# Patient Record
Sex: FEMALE | Race: WHITE | NOT HISPANIC OR LATINO | Employment: FULL TIME | URBAN - METROPOLITAN AREA
[De-identification: names, ages, dates, MRNs, and addresses within clinical notes are randomized per-mention and may not be internally consistent; named-entity substitution may affect disease eponyms.]

---

## 2023-05-26 ENCOUNTER — OFFICE VISIT (OUTPATIENT)
Dept: FAMILY MEDICINE CLINIC | Facility: CLINIC | Age: 43
End: 2023-05-26

## 2023-05-26 VITALS
WEIGHT: 174.4 LBS | SYSTOLIC BLOOD PRESSURE: 122 MMHG | OXYGEN SATURATION: 99 % | TEMPERATURE: 97.8 F | BODY MASS INDEX: 30.9 KG/M2 | DIASTOLIC BLOOD PRESSURE: 78 MMHG | HEART RATE: 95 BPM | HEIGHT: 63 IN

## 2023-05-26 DIAGNOSIS — Z12.31 SCREENING MAMMOGRAM FOR BREAST CANCER: ICD-10-CM

## 2023-05-26 DIAGNOSIS — M25.50 ARTHRALGIA, UNSPECIFIED JOINT: ICD-10-CM

## 2023-05-26 DIAGNOSIS — Z11.59 ENCOUNTER FOR HEPATITIS C SCREENING TEST FOR LOW RISK PATIENT: ICD-10-CM

## 2023-05-26 DIAGNOSIS — Z11.4 SCREENING FOR HIV (HUMAN IMMUNODEFICIENCY VIRUS): ICD-10-CM

## 2023-05-26 DIAGNOSIS — Z13.220 SCREENING FOR LIPID DISORDERS: ICD-10-CM

## 2023-05-26 DIAGNOSIS — Z13.1 SCREENING FOR DIABETES MELLITUS: ICD-10-CM

## 2023-05-26 DIAGNOSIS — Z00.00 ANNUAL PHYSICAL EXAM: Primary | ICD-10-CM

## 2023-05-26 DIAGNOSIS — Z12.4 SCREENING FOR CERVICAL CANCER: ICD-10-CM

## 2023-05-26 DIAGNOSIS — E66.9 OBESITY (BMI 30.0-34.9): ICD-10-CM

## 2023-05-26 DIAGNOSIS — R53.82 CHRONIC FATIGUE: ICD-10-CM

## 2023-05-26 DIAGNOSIS — G47.00 INSOMNIA, UNSPECIFIED TYPE: ICD-10-CM

## 2023-05-26 PROBLEM — E66.811 OBESITY (BMI 30.0-34.9): Status: ACTIVE | Noted: 2023-05-26

## 2023-05-26 RX ORDER — PHENTERMINE HYDROCHLORIDE 37.5 MG/1
37.5 CAPSULE ORAL EVERY MORNING
COMMUNITY

## 2023-05-26 NOTE — PROGRESS NOTES
[facility]    NAME: Brandee Reis  AGE: 39 y o  SEX: female  : 1980     DATE: 2023     Assessment and Plan:     Problem List Items Addressed This Visit        Other    Screening mammogram for breast cancer    Relevant Orders    Mammo screening bilateral w 3d & cad    Screening for cervical cancer    Relevant Orders    Ambulatory Referral to Gynecology    Annual physical exam - Primary    Obesity (BMI 30 0-34  9)    Insomnia    Chronic fatigue    Relevant Orders    Comprehensive metabolic panel    TSH, 3rd generation with Free T4 reflex    Arthralgia    Relevant Orders    Comprehensive metabolic panel    TSH, 3rd generation with Free T4 reflex    CBC and differential    Screening for diabetes mellitus    Relevant Orders    HEMOGLOBIN A1C W/ EAG ESTIMATION    Screening for lipid disorders    Relevant Orders    Lipid Panel with Direct LDL reflex    Encounter for hepatitis C screening test for low risk patient    Relevant Orders    Hepatitis C antibody    Screening for HIV (human immunodeficiency virus)    Relevant Orders    : HIV 1/2 AB/AG w Reflex [facility] for 2 yr old and above       Immunizations and preventive care screenings were discussed with patient today  Appropriate education was printed on patient's after visit summary  Counseling:  Injury prevention: discussed safety/seat belts, safety helmets, smoke detectors, carbon dioxide detectors, and smoking near bedding or upholstery  Exercise: the importance of regular exercise/physical activity was discussed  Recommend exercise 3-5 times per week for at least 30 minutes  BMI Counseling: Body mass index is 30 89 kg/m²   The BMI is above normal  Nutrition recommendations include decreasing portion sizes, encouraging healthy choices of fruits and vegetables, decreasing fast food intake, consuming healthier snacks, limiting drinks that contain sugar, moderation in carbohydrate intake, increasing intake of lean protein, reducing intake of saturated and trans fat and reducing intake of cholesterol  Exercise recommendations include moderate physical activity 150 minutes/week  No pharmacotherapy was ordered  Patient referred to PCP  Rationale for BMI follow-up plan is due to patient being overweight or obese  Depression Screening and Follow-up Plan: Patient was screened for depression during today's encounter  They screened negative with a PHQ-2 score of 0  Return in 1 year (on 5/26/2024)  Chief Complaint:     Chief Complaint   Patient presents with   • Establish Care      History of Present Illness:     Adult Annual Physical   Patient here for a comprehensive physical exam  The patient reports problems - herer to establish care   Patient here today to establish care and reports that she is due for her annual and has not been to PCP in a long time and would like ot have her labs checked for hypothyroid and is tryng to lose weight for the past year and is not losing weight Patient has no issues with past medical history Patient is recently on Phentermine Dr Randall Barthel and has been on for weight loss recently  Diet and Physical Activity  Diet/Nutrition: well balanced diet  Exercise: walking  Depression Screening  PHQ-2/9 Depression Screening    Little interest or pleasure in doing things: 0 - not at all  Feeling down, depressed, or hopeless: 0 - not at all  PHQ-2 Score: 0  PHQ-2 Interpretation: Negative depression screen       General Health  Sleep: sleeps poorly and unrefreshing sleep  Hearing: normal - bilateral   Vision: no vision problems, most recent eye exam <1 year ago and wears contacts  Dental: regular dental visits  /GYN Health  Patient is: perimenopausal  Last menstrual period: 4/25/2023  Contraceptive method: none  Review of Systems:     Review of Systems   Constitutional: Positive for fatigue   Negative for activity change, appetite change, chills, diaphoresis, fever and unexpected weight change  Patient has eliminated dairy and carbs and eating salads and eating healthy diet Patient also exercises and walking and does about 30 minutes daily    HENT: Negative for congestion, dental problem, drooling, ear discharge, ear pain, facial swelling, hearing loss, postnasal drip, sinus pressure, sinus pain, sneezing, sore throat, tinnitus, trouble swallowing and voice change  Eyes: Negative for pain, discharge, redness, itching and visual disturbance  Wearing contacts and last eye exam was in 12/2022   Respiratory: Negative for apnea, cough, choking, chest tightness, shortness of breath, wheezing and stridor  Cardiovascular: Negative for chest pain, palpitations and leg swelling  Gastrointestinal: Positive for constipation  Negative for abdominal distention, abdominal pain, anal bleeding, blood in stool, diarrhea, nausea, rectal pain and vomiting  Endocrine: Positive for polydipsia  Genitourinary: Negative  Musculoskeletal: Positive for arthralgias and myalgias  Negative for back pain, gait problem, joint swelling, neck pain and neck stiffness  Skin: Negative  Allergic/Immunologic: Negative  Negative for environmental allergies and food allergies  Neurological: Negative for dizziness, tremors, seizures, syncope, facial asymmetry, speech difficulty, weakness, light-headedness, numbness and headaches  Hematological: Negative  Negative for adenopathy  Does not bruise/bleed easily  Psychiatric/Behavioral: Negative for agitation, behavioral problems, confusion, decreased concentration, dysphoric mood, hallucinations, self-injury, sleep disturbance and suicidal ideas  The patient is not nervous/anxious and is not hyperactive  Has had troubles with insomnia in the past       Past Medical History:     No past medical history on file  Past Surgical History:     No past surgical history on file  "Social History:     Social History     Socioeconomic History   • Marital status:      Spouse name: None   • Number of children: None   • Years of education: None   • Highest education level: None   Occupational History   • None   Tobacco Use   • Smoking status: Never   • Smokeless tobacco: Never   Substance and Sexual Activity   • Alcohol use: None   • Drug use: None   • Sexual activity: None   Other Topics Concern   • None   Social History Narrative   • None     Social Determinants of Health     Financial Resource Strain: Not on file   Food Insecurity: Not on file   Transportation Needs: Not on file   Physical Activity: Not on file   Stress: Not on file   Social Connections: Not on file   Intimate Partner Violence: Not on file   Housing Stability: Not on file      Family History:     No family history on file  Current Medications:     Current Outpatient Medications   Medication Sig Dispense Refill   • phentermine 37 5 MG capsule Take 37 5 mg by mouth every morning       No current facility-administered medications for this visit  Allergies:     No Known Allergies   Physical Exam:     /78 (BP Location: Left arm, Patient Position: Sitting)   Pulse 95   Temp 97 8 °F (36 6 °C) (Temporal)   Ht 5' 3\" (1 6 m)   Wt 79 1 kg (174 lb 6 4 oz)   SpO2 99%   BMI 30 89 kg/m²     Physical Exam  Vitals and nursing note reviewed  Constitutional:       General: She is not in acute distress  Appearance: She is well-developed  HENT:      Head: Normocephalic and atraumatic  Eyes:      Conjunctiva/sclera: Conjunctivae normal    Cardiovascular:      Rate and Rhythm: Normal rate and regular rhythm  Heart sounds: No murmur heard  Pulmonary:      Effort: Pulmonary effort is normal  No respiratory distress  Breath sounds: Normal breath sounds  Abdominal:      Palpations: Abdomen is soft  Tenderness: There is no abdominal tenderness  Musculoskeletal:         General: No swelling        " Cervical back: Neck supple  Skin:     General: Skin is warm and dry  Capillary Refill: Capillary refill takes less than 2 seconds  Neurological:      Mental Status: She is alert     Psychiatric:         Mood and Affect: Mood normal           Irma Abdalla, [address]

## 2023-07-25 PROBLEM — Z12.4 SCREENING FOR CERVICAL CANCER: Status: RESOLVED | Noted: 2023-05-26 | Resolved: 2023-07-25

## 2023-07-25 PROBLEM — Z13.220 SCREENING FOR LIPID DISORDERS: Status: RESOLVED | Noted: 2023-05-26 | Resolved: 2023-07-25

## 2023-07-25 PROBLEM — Z13.1 SCREENING FOR DIABETES MELLITUS: Status: RESOLVED | Noted: 2023-05-26 | Resolved: 2023-07-25

## 2024-04-24 ENCOUNTER — APPOINTMENT (OUTPATIENT)
Dept: LAB | Facility: MEDICAL CENTER | Age: 44
End: 2024-04-24
Payer: COMMERCIAL

## 2024-04-24 DIAGNOSIS — M25.50 ARTHRALGIA, UNSPECIFIED JOINT: ICD-10-CM

## 2024-04-24 DIAGNOSIS — Z11.4 SCREENING FOR HIV (HUMAN IMMUNODEFICIENCY VIRUS): ICD-10-CM

## 2024-04-24 DIAGNOSIS — R53.82 CHRONIC FATIGUE: ICD-10-CM

## 2024-04-24 DIAGNOSIS — Z13.1 SCREENING FOR DIABETES MELLITUS: ICD-10-CM

## 2024-04-24 DIAGNOSIS — Z13.220 SCREENING FOR LIPID DISORDERS: ICD-10-CM

## 2024-04-24 DIAGNOSIS — Z11.59 ENCOUNTER FOR HEPATITIS C SCREENING TEST FOR LOW RISK PATIENT: ICD-10-CM

## 2024-04-24 LAB
ALBUMIN SERPL BCP-MCNC: 3.9 G/DL (ref 3.5–5)
ALP SERPL-CCNC: 47 U/L (ref 34–104)
ALT SERPL W P-5'-P-CCNC: 10 U/L (ref 7–52)
ANION GAP SERPL CALCULATED.3IONS-SCNC: 5 MMOL/L (ref 4–13)
AST SERPL W P-5'-P-CCNC: 13 U/L (ref 13–39)
BASOPHILS # BLD AUTO: 0.05 THOUSANDS/ÂΜL (ref 0–0.1)
BASOPHILS NFR BLD AUTO: 1 % (ref 0–1)
BILIRUB SERPL-MCNC: 0.53 MG/DL (ref 0.2–1)
BUN SERPL-MCNC: 12 MG/DL (ref 5–25)
CALCIUM SERPL-MCNC: 9.1 MG/DL (ref 8.4–10.2)
CHLORIDE SERPL-SCNC: 106 MMOL/L (ref 96–108)
CHOLEST SERPL-MCNC: 180 MG/DL
CO2 SERPL-SCNC: 28 MMOL/L (ref 21–32)
CREAT SERPL-MCNC: 0.75 MG/DL (ref 0.6–1.3)
EOSINOPHIL # BLD AUTO: 0.11 THOUSAND/ÂΜL (ref 0–0.61)
EOSINOPHIL NFR BLD AUTO: 2 % (ref 0–6)
ERYTHROCYTE [DISTWIDTH] IN BLOOD BY AUTOMATED COUNT: 12.6 % (ref 11.6–15.1)
EST. AVERAGE GLUCOSE BLD GHB EST-MCNC: 97 MG/DL
GFR SERPL CREATININE-BSD FRML MDRD: 97 ML/MIN/1.73SQ M
GLUCOSE P FAST SERPL-MCNC: 75 MG/DL (ref 65–99)
HBA1C MFR BLD: 5 %
HCT VFR BLD AUTO: 42.5 % (ref 34.8–46.1)
HCV AB SER QL: NORMAL
HDLC SERPL-MCNC: 62 MG/DL
HGB BLD-MCNC: 13.8 G/DL (ref 11.5–15.4)
HIV 1+2 AB+HIV1 P24 AG SERPL QL IA: NORMAL
HIV 2 AB SERPL QL IA: NORMAL
HIV1 AB SERPL QL IA: NORMAL
HIV1 P24 AG SERPL QL IA: NORMAL
IMM GRANULOCYTES # BLD AUTO: 0.02 THOUSAND/UL (ref 0–0.2)
IMM GRANULOCYTES NFR BLD AUTO: 0 % (ref 0–2)
LDLC SERPL CALC-MCNC: 105 MG/DL (ref 0–100)
LYMPHOCYTES # BLD AUTO: 1.53 THOUSANDS/ÂΜL (ref 0.6–4.47)
LYMPHOCYTES NFR BLD AUTO: 29 % (ref 14–44)
MCH RBC QN AUTO: 30.5 PG (ref 26.8–34.3)
MCHC RBC AUTO-ENTMCNC: 32.5 G/DL (ref 31.4–37.4)
MCV RBC AUTO: 94 FL (ref 82–98)
MONOCYTES # BLD AUTO: 0.47 THOUSAND/ÂΜL (ref 0.17–1.22)
MONOCYTES NFR BLD AUTO: 9 % (ref 4–12)
NEUTROPHILS # BLD AUTO: 3.18 THOUSANDS/ÂΜL (ref 1.85–7.62)
NEUTS SEG NFR BLD AUTO: 59 % (ref 43–75)
NRBC BLD AUTO-RTO: 0 /100 WBCS
PLATELET # BLD AUTO: 200 THOUSANDS/UL (ref 149–390)
PMV BLD AUTO: 11.6 FL (ref 8.9–12.7)
POTASSIUM SERPL-SCNC: 4.3 MMOL/L (ref 3.5–5.3)
PROT SERPL-MCNC: 6.9 G/DL (ref 6.4–8.4)
RBC # BLD AUTO: 4.53 MILLION/UL (ref 3.81–5.12)
SODIUM SERPL-SCNC: 139 MMOL/L (ref 135–147)
T4 FREE SERPL-MCNC: 0.65 NG/DL (ref 0.61–1.12)
TRIGL SERPL-MCNC: 66 MG/DL
TSH SERPL DL<=0.05 MIU/L-ACNC: 4.79 UIU/ML (ref 0.45–4.5)
WBC # BLD AUTO: 5.36 THOUSAND/UL (ref 4.31–10.16)

## 2024-04-24 PROCEDURE — 84439 ASSAY OF FREE THYROXINE: CPT

## 2024-04-24 PROCEDURE — 84443 ASSAY THYROID STIM HORMONE: CPT

## 2024-04-24 PROCEDURE — 80053 COMPREHEN METABOLIC PANEL: CPT

## 2024-04-24 PROCEDURE — 87389 HIV-1 AG W/HIV-1&-2 AB AG IA: CPT

## 2024-04-24 PROCEDURE — 80061 LIPID PANEL: CPT

## 2024-04-24 PROCEDURE — 83036 HEMOGLOBIN GLYCOSYLATED A1C: CPT

## 2024-04-24 PROCEDURE — 36415 COLL VENOUS BLD VENIPUNCTURE: CPT

## 2024-04-24 PROCEDURE — 86803 HEPATITIS C AB TEST: CPT

## 2024-04-24 PROCEDURE — 85025 COMPLETE CBC W/AUTO DIFF WBC: CPT

## 2024-04-25 ENCOUNTER — TELEPHONE (OUTPATIENT)
Age: 44
End: 2024-04-25

## 2024-04-25 DIAGNOSIS — R79.89 ABNORMAL TSH: Primary | ICD-10-CM

## 2024-04-25 NOTE — TELEPHONE ENCOUNTER
Patient called in to follow up on lab results. Patient would like someone to review them with her. Warm transfer to CTS attempted but unsuccessful. Please return a patient call.

## 2024-04-25 NOTE — TELEPHONE ENCOUNTER
Spoke with pt to try to set up appt sooner then the May date with Dr Kennedy,. She said she would like Dr Kennedy to review results and advise if there is anything she could do to try to get better until the appt in May. She has been feeling horrible as far as no energy, achey, very fatigued. Something has to be going on or there muist be something she can try. Please review and advise

## 2024-04-26 ENCOUNTER — APPOINTMENT (OUTPATIENT)
Age: 44
End: 2024-04-26
Payer: COMMERCIAL

## 2024-04-26 ENCOUNTER — TELEPHONE (OUTPATIENT)
Age: 44
End: 2024-04-26

## 2024-04-26 DIAGNOSIS — R79.89 ABNORMAL TSH: ICD-10-CM

## 2024-04-26 PROCEDURE — 36415 COLL VENOUS BLD VENIPUNCTURE: CPT

## 2024-04-26 PROCEDURE — 86376 MICROSOMAL ANTIBODY EACH: CPT

## 2024-04-26 NOTE — TELEPHONE ENCOUNTER
Patient called back and received information. She was at another doctor's office and was unable to schedule at the time. She states she would call back to schedule an appointment.

## 2024-04-26 NOTE — TELEPHONE ENCOUNTER
Patient called in to return a missed call from [facility]. Warm transferred to [facility].

## 2024-04-27 LAB — THYROPEROXIDASE AB SERPL-ACNC: 14 IU/ML (ref 0–34)

## 2024-04-28 DIAGNOSIS — R79.89 ABNORMAL TSH: Primary | ICD-10-CM

## 2024-05-14 ENCOUNTER — OFFICE VISIT (OUTPATIENT)
Dept: BARIATRICS | Facility: CLINIC | Age: 44
End: 2024-05-14
Payer: COMMERCIAL

## 2024-05-14 VITALS
SYSTOLIC BLOOD PRESSURE: 110 MMHG | DIASTOLIC BLOOD PRESSURE: 80 MMHG | RESPIRATION RATE: 14 BRPM | BODY MASS INDEX: 30.56 KG/M2 | HEIGHT: 64 IN | HEART RATE: 76 BPM | WEIGHT: 179 LBS

## 2024-05-14 DIAGNOSIS — R53.82 CHRONIC FATIGUE: ICD-10-CM

## 2024-05-14 DIAGNOSIS — N92.6 IRREGULAR MENSES: ICD-10-CM

## 2024-05-14 DIAGNOSIS — E66.09 CLASS 1 OBESITY DUE TO EXCESS CALORIES IN ADULT: Primary | ICD-10-CM

## 2024-05-14 DIAGNOSIS — G47.00 INSOMNIA: ICD-10-CM

## 2024-05-14 PROCEDURE — 99204 OFFICE O/P NEW MOD 45 MIN: CPT | Performed by: FAMILY MEDICINE

## 2024-05-14 NOTE — PROGRESS NOTES
Assessment/Plan:  Radha was seen today for consult.    Diagnoses and all orders for this visit:    Class 1 obesity due to excess calories in adult  -     Semaglutide-Weight Management (WEGOVY) 0.25 MG/0.5ML; Inject 0.5 mL (0.25 mg total) under the skin once a week    Chronic fatigue    Insomnia    Irregular menses    If Wegovy not covered could try Phentermine again  TSH high will repeat in 1 mo  - Patient counselled about the potential side effects of this medication that are listed by the drug .Patient denies personal history of pancreatitis. Patient also denies personal and family history of medullary thyroid cancer and multiple endocrine neoplasia type 2 (MEN 2 tumor). Contraception methods: tubal ligation   Patient counselled to view the website for this medicine and read detailed instructions and side effects. Patient was given opportunity to ask questions and all questions were answered.  Patient confirms understanding and agrees to use the medication as prescribed.       Obesity:   Weight not at goal and patient tried more than 6 months to lose weight and was not able to achieve a meaningful weight loss above 5%  - Discussed options of [facility]-Intensive Lifestyle Intervention Program and Conservative Program and the role of weight loss medications.  - Patient is interested in pursuing Conservative Program  - Initial weight loss goal of 5-10% weight loss for improved health  - Weight loss can improve patient's co-morbid conditions and/or prevent weight-related complications.  Motivational interview performed and patient noted changes to work on until next visit  Handouts provided:  Calorie goal handouts provided  1200kcal  Hydration: 64oz fluid, no sugary drinks  Goal 3 meals per day  Food log encouraged , phone michael or paper journal  Increase physical activity by 10 minutes daily.   Return in 1 mo    Subjective:   Chief Complaint   Patient presents with    Consult     Initial visit with  "Medical Bariatrician.       Patient ID: Radha Lan  is a 44 y.o. female with excess weight/obesity here to pursue weight management.  Previous notes and records have been reviewed.        HPI  Wt Readings from Last 20 Encounters:   24 81.2 kg (179 lb)   23 79.1 kg (174 lb 6.4 oz)   Body mass index is 31.21 kg/m².   Was able to lose 20lbs 2 years back   Did a lot of changes in her diet in the past 2 years no red meat   Does not feel hungry   B-eggs   L-salmon pepper sweet potato  D-salad  Snacks:sliced apple   Hydration:water one cup coffee   Alcohol: no  Smoking:no  Exercise:walking 25 min daily at lunch time  Occupation:accounting   Sleep:not well    History reviewed. No pertinent past medical history.  Past Surgical History:   Procedure Laterality Date     SECTION       The following portions of the patient's history were reviewed and updated as appropriate: allergies, current medications, past family history, past medical history, past social history, past surgical history, and problem list.    ROS:  Review of Systems   Constitutional: Negative for activity change. Fatigue  HENT: Negative for trouble swallowing.    Respiratory: Negative for shortness of breath.    Cardiovascular: Negative for chest pain, edema  Gastrointestinal: Negative for abdominal pain, nausea and vomiting, acid reflux, constipation/diarrhea  Endocrine:  abnormal menses   Psychiatric/Behavioral: Negative for behavioral problems including anxiety /depression  Objective:  /80 (BP Location: Left arm, Patient Position: Sitting, Cuff Size: Standard)   Pulse 76   Resp 14   Ht 5' 3.5\" (1.613 m)   Wt 81.2 kg (179 lb)   BMI 31.21 kg/m²   Constitutional: Well-developed, well-nourished and Obese Body mass index is 31.21 kg/m².. Alert, cooperative.  HEENT: No conjunctival injection. No thyroid masses  Pulmonary: No increased work of breathing or signs of respiratory distress.Clear respiratory sounds.  CV: Well-perfused, " Regular rate and rhythm, no murmurs  Vascular: no peripheral edema  GI: Abdomen obese, Non-distended. Not tender  MSK: no sarcopenia noted   Neuro: Oriented to person, place and time. Normal Speech. Normal gait.  Psych: Normal affect and mood. Normal thought process, no delusions     Labs and Imaging  Recent labs and imaging have been personally reviewed.  Lab Results   Component Value Date    WBC 5.36 04/24/2024    HGB 13.8 04/24/2024    HCT 42.5 04/24/2024    MCV 94 04/24/2024     04/24/2024     Lab Results   Component Value Date    SODIUM 139 04/24/2024    K 4.3 04/24/2024     04/24/2024    CO2 28 04/24/2024    AGAP 5 04/24/2024    BUN 12 04/24/2024    CREATININE 0.75 04/24/2024    GLUF 75 04/24/2024    CALCIUM 9.1 04/24/2024    AST 13 04/24/2024    ALT 10 04/24/2024    ALKPHOS 47 04/24/2024    TP 6.9 04/24/2024    TBILI 0.53 04/24/2024    EGFR 97 04/24/2024     Lab Results   Component Value Date    HGBA1C 5.0 04/24/2024     Lab Results   Component Value Date    FYN3CNFDAHAE 4.795 (H) 04/24/2024     Lab Results   Component Value Date    CHOLESTEROL 180 04/24/2024     Lab Results   Component Value Date    HDL 62 04/24/2024     Lab Results   Component Value Date    TRIG 66 04/24/2024     Lab Results   Component Value Date    LDLCALC 105 (H) 04/24/2024

## 2024-05-15 ENCOUNTER — TELEPHONE (OUTPATIENT)
Age: 44
End: 2024-05-15

## 2024-05-15 NOTE — TELEPHONE ENCOUNTER
PA for wegovy    Submitted via    []CMM-KEY    [x][place]-Case ID #    []Faxed to plan   []Other website    []Phone call Case ID #       Office notes sent, clinical questions answered. Awaiting determination    Turnaround time for your insurance to make a decision on your Prior Authorization can take 7-21 business days.

## 2024-05-16 NOTE — TELEPHONE ENCOUNTER
PA for wegovy EXCLUDED from plan       Reason:See attached in media        Message sent to office clinical pool Yes

## 2024-05-19 ENCOUNTER — RA CDI HCC (OUTPATIENT)
Dept: OTHER | Facility: HOSPITAL | Age: 44
End: 2024-05-19

## 2024-05-20 NOTE — PROGRESS NOTES
HCC coding opportunities       Chart reviewed, no opportunity found: CHART REVIEWED, NO OPPORTUNITY FOUND        Patients Insurance        Commercial Insurance: [employer]

## 2024-05-20 NOTE — TELEPHONE ENCOUNTER
Patient called for an update, advised her we are just waiting to hear from  what she would like to send in instead since Wegovy is excluded from plan.

## 2024-05-21 LAB
T4 FREE SERPL-MCNC: 0.9 NG/DL (ref 0.8–1.8)
TSH SERPL-ACNC: 3.87 MIU/L

## 2024-05-24 ENCOUNTER — TELEPHONE (OUTPATIENT)
Dept: BARIATRICS | Facility: CLINIC | Age: 44
End: 2024-05-24

## 2024-05-24 ENCOUNTER — TELEPHONE (OUTPATIENT)
Age: 44
End: 2024-05-24

## 2024-05-24 NOTE — TELEPHONE ENCOUNTER
Good Day,    Please see patient's request for alternate therapy due to medication denial. Please advise staff how best to guide patient.    Thank you.

## 2024-05-24 NOTE — TELEPHONE ENCOUNTER
"Message sent to patient via MY CHART PORTAL:     \" Dr. Rogers will be returning on May 29th to the office. At that time she will review your chart and decide if there is another option for medication. Thank you for your patience \"."

## 2024-05-24 NOTE — TELEPHONE ENCOUNTER
Patient returning call, she called in 5/20 regarding Wegovy not being covered under her plan and is looking for other recommendations for medication for her to try instead. Patient can be reached at [phone]

## 2024-05-24 NOTE — TELEPHONE ENCOUNTER
"MESSAGE SENT TO PATIENT'S MY CHART PORTAL:      \"Dr. Rogers will be returning on May 29th to the office. At that time she will review your chart and decide if there is another option for medication. Thank you for your patience\" .  "

## 2024-05-28 ENCOUNTER — OFFICE VISIT (OUTPATIENT)
Dept: FAMILY MEDICINE CLINIC | Facility: CLINIC | Age: 44
End: 2024-05-28
Payer: COMMERCIAL

## 2024-05-28 VITALS
HEART RATE: 84 BPM | HEIGHT: 64 IN | SYSTOLIC BLOOD PRESSURE: 112 MMHG | DIASTOLIC BLOOD PRESSURE: 80 MMHG | OXYGEN SATURATION: 100 % | WEIGHT: 181 LBS | TEMPERATURE: 97.8 F | BODY MASS INDEX: 30.9 KG/M2

## 2024-05-28 DIAGNOSIS — L65.9 HAIR LOSS: ICD-10-CM

## 2024-05-28 DIAGNOSIS — R53.82 CHRONIC FATIGUE: ICD-10-CM

## 2024-05-28 DIAGNOSIS — M79.10 MYALGIA: ICD-10-CM

## 2024-05-28 DIAGNOSIS — G47.00 INSOMNIA, UNSPECIFIED TYPE: ICD-10-CM

## 2024-05-28 DIAGNOSIS — M25.50 ARTHRALGIA, UNSPECIFIED JOINT: ICD-10-CM

## 2024-05-28 DIAGNOSIS — Z12.11 SCREENING FOR COLON CANCER: ICD-10-CM

## 2024-05-28 DIAGNOSIS — Z00.00 HEALTH MAINTENANCE EXAMINATION: Primary | ICD-10-CM

## 2024-05-28 DIAGNOSIS — Z12.31 SCREENING MAMMOGRAM FOR BREAST CANCER: ICD-10-CM

## 2024-05-28 PROBLEM — Z11.4 SCREENING FOR HIV (HUMAN IMMUNODEFICIENCY VIRUS): Status: RESOLVED | Noted: 2023-05-26 | Resolved: 2024-05-28

## 2024-05-28 PROBLEM — Z11.59 ENCOUNTER FOR HEPATITIS C SCREENING TEST FOR LOW RISK PATIENT: Status: RESOLVED | Noted: 2023-05-26 | Resolved: 2024-05-28

## 2024-05-28 PROCEDURE — 99396 PREV VISIT EST AGE 40-64: CPT | Performed by: FAMILY MEDICINE

## 2024-05-28 NOTE — PROGRESS NOTES
Ambulatory Visit  Name: Radha Lan      : 1980      MRN: [id number]  Encounter Provider: Asa Kennedy MD  Encounter Date: 2024   Encounter department: [facility]    Assessment & Plan   1. Health maintenance examination  Normal exam    2. Screening mammogram for breast cancer  -     Mammo screening bilateral w 3d & cad; Future  3. Hair loss  -     Iron Panel (Includes Ferritin, Iron Sat%, Iron, and TIBC); Future  4. Arthralgia, unspecified joint  -     TARAN Screen w/ Reflex to Titer/Pattern; Future  -     Rheumatoid Arthritis Factor; Future  -     C-reactive protein; Future  5. Screening for colon cancer  -     Ambulatory referral to Gastroenterology; Future  -     Ambulatory referral to Gastroenterology; Future  6. Myalgia  -     CK; Future  7. Chronic fatigue  8. Insomnia, unspecified type    Follow up in 1 year or as needed       History of Present Illness     Patient is here for a yearly exam.  She has been losing her hair also has multiple joint aches and pains.  Feels tired all the time and also has insomnia.  Had abnormal TSH in the past which has now normalized.      Review of Systems   Constitutional:  Positive for fatigue. Negative for activity change, appetite change and fever.   HENT:  Negative for congestion and ear discharge.    Respiratory:  Negative for cough and shortness of breath.    Cardiovascular:  Negative for chest pain and palpitations.   Gastrointestinal:  Negative for diarrhea and nausea.   Musculoskeletal:  Positive for arthralgias and myalgias. Negative for back pain.   Skin:  Negative for color change and rash.   Neurological:  Negative for dizziness and headaches.   Psychiatric/Behavioral:  Positive for sleep disturbance. Negative for agitation and behavioral problems.      History reviewed. No pertinent past medical history.  Past Surgical History:   Procedure Laterality Date   •  SECTION       Family History   Problem Relation Age of Onset  "  • Arthritis Mother    • Esophageal cancer Father    • Ovarian cancer Sister      Social History     Tobacco Use   • Smoking status: Never   • Smokeless tobacco: Never   Substance and Sexual Activity   • Alcohol use: Not Currently   • Drug use: Never   • Sexual activity: Not on file     Current Outpatient Medications on File Prior to Visit   Medication Sig   • [DISCONTINUED] Semaglutide-Weight Management (WEGOVY) 0.25 MG/0.5ML Inject 0.5 mL (0.25 mg total) under the skin once a week (Patient not taking: Reported on 5/28/2024)     No Known Allergies  Immunization History   Administered Date(s) Administered   • COVID-19 PFIZER VACCINE 0.3 ML IM 01/07/2022, 03/07/2022   • COVID-19 Pfizer vac (Ravindra-sucrose, gray cap) 12 yr+ IM 03/07/2022     Objective     /80 (BP Location: Left arm, Patient Position: Sitting)   Pulse 84   Temp 97.8 °F (36.6 °C) (Temporal)   Ht 5' 3.5\" (1.613 m)   Wt 82.1 kg (181 lb)   SpO2 100%   BMI 31.56 kg/m²     Physical Exam  Vitals and nursing note reviewed.   Constitutional:       General: She is not in acute distress.     Appearance: She is well-developed.   HENT:      Head: Normocephalic and atraumatic.   Eyes:      Conjunctiva/sclera: Conjunctivae normal.   Cardiovascular:      Rate and Rhythm: Normal rate and regular rhythm.      Heart sounds: No murmur heard.  Pulmonary:      Effort: Pulmonary effort is normal. No respiratory distress.      Breath sounds: Normal breath sounds.   Abdominal:      Palpations: Abdomen is soft.      Tenderness: There is no abdominal tenderness.   Musculoskeletal:         General: No swelling.      Cervical back: Neck supple.   Skin:     General: Skin is warm and dry.      Capillary Refill: Capillary refill takes less than 2 seconds.   Neurological:      Mental Status: She is alert.   Psychiatric:         Mood and Affect: Mood normal.       Administrative Statements         "

## 2024-06-06 ENCOUNTER — TELEPHONE (OUTPATIENT)
Age: 44
End: 2024-06-06

## 2024-06-06 DIAGNOSIS — E66.9 OBESITY, CLASS I, BMI 30-34.9: Primary | ICD-10-CM

## 2024-06-06 RX ORDER — PHENTERMINE HYDROCHLORIDE 15 MG/1
15 CAPSULE ORAL EVERY MORNING
Qty: 30 CAPSULE | Refills: 1 | Status: SHIPPED | OUTPATIENT
Start: 2024-06-06

## 2024-06-06 NOTE — TELEPHONE ENCOUNTER
Patient was never contacted after the last [employer] message regarding medication alternatives to Wegovy.  Can someone please give her a call today?  Thanks.

## 2024-06-12 ENCOUNTER — TELEPHONE (OUTPATIENT)
Age: 44
End: 2024-06-12

## 2024-06-12 ENCOUNTER — PREP FOR PROCEDURE (OUTPATIENT)
Age: 44
End: 2024-06-12

## 2024-06-12 DIAGNOSIS — Z12.11 SCREENING FOR COLON CANCER: Primary | ICD-10-CM

## 2024-06-12 NOTE — TELEPHONE ENCOUNTER
Scheduled date of colonoscopy (as of today): 6/28/24  Physician performing colonoscopy: Jaspreet  Location of colonoscopy: [facility]  Bowel prep reviewed with patient: teresita/[email or web address]  Instructions to [facility]    06/12/24  Screened by: Shandra Garza    Referring Provider     Pre- Screening:     There is no height or weight on file to calculate BMI. 31.56  Has patient been referred for a routine screening Colonoscopy? yes  Is the patient between 45-75 years old? yes      Previous Colonoscopy no   If yes:    Date:     Facility:     Reason:         Does the patient want to see a Gastroenterologist prior to their procedure OR are they having any GI symptoms? no    Has the patient been hospitalized or had abdominal surgery in the past 6 months? no    Does the patient use supplemental oxygen? no    Does the patient take Coumadin, Lovenox, Plavix, Elliquis, Xarelto, or other blood thinning medication? no    Has the patient had a stroke, cardiac event, or stent placed in the past year? no      If patient is between 45yrs - 49yrs, please advise patient that we will have to confirm benefits & coverage with their insurance company for a routine screening colonoscopy.

## 2024-06-27 PROBLEM — Z00.00 HEALTH MAINTENANCE EXAMINATION: Status: RESOLVED | Noted: 2024-05-28 | Resolved: 2024-06-27

## 2024-07-09 ENCOUNTER — HOSPITAL ENCOUNTER (OUTPATIENT)
Dept: RADIOLOGY | Facility: MEDICAL CENTER | Age: 44
Discharge: HOME | End: 2024-07-09
Payer: COMMERCIAL

## 2024-07-09 VITALS — HEIGHT: 63 IN | BODY MASS INDEX: 32.07 KG/M2 | WEIGHT: 181 LBS

## 2024-07-09 DIAGNOSIS — Z12.31 SCREENING MAMMOGRAM FOR BREAST CANCER: ICD-10-CM

## 2024-07-09 PROCEDURE — 77067 SCR MAMMO BI INCL CAD: CPT

## 2024-07-09 PROCEDURE — 77063 BREAST TOMOSYNTHESIS BI: CPT

## 2024-08-08 ENCOUNTER — TELEPHONE (OUTPATIENT)
Dept: FAMILY MEDICINE CLINIC | Facility: CLINIC | Age: 44
End: 2024-08-08

## 2024-08-08 ENCOUNTER — HOSPITAL ENCOUNTER (OUTPATIENT)
Dept: ULTRASOUND IMAGING | Facility: CLINIC | Age: 44
End: 2024-08-08
Payer: COMMERCIAL

## 2024-08-08 ENCOUNTER — HOSPITAL ENCOUNTER (OUTPATIENT)
Dept: MAMMOGRAPHY | Facility: CLINIC | Age: 44
End: 2024-08-08
Payer: COMMERCIAL

## 2024-08-08 DIAGNOSIS — R92.8 ABNORMAL MAMMOGRAM: ICD-10-CM

## 2024-08-08 PROCEDURE — 76642 ULTRASOUND BREAST LIMITED: CPT

## 2024-08-08 PROCEDURE — G0279 TOMOSYNTHESIS, MAMMO: HCPCS

## 2024-08-08 PROCEDURE — 77065 DX MAMMO INCL CAD UNI: CPT

## 2024-08-08 NOTE — TELEPHONE ENCOUNTER
----- Message from Kailee Cardona PA-C sent at 8/8/2024  3:31 PM EDT -----  Dr. Kennedy pt -     Mammogram showed the left breast to have asymmetry with a mass and a cyst of the right breast. They believe it is asymmetric breast tissue, cyst, and fibroadenoma and are recommending a diagnostic US and mammogram in six months. It looks like they have already ordered these - please make sure she schedules them. Let me know if she has any questions.

## 2024-08-26 ENCOUNTER — TELEPHONE (OUTPATIENT)
Dept: GASTROENTEROLOGY | Facility: CLINIC | Age: 44
End: 2024-08-26

## 2024-08-26 NOTE — TELEPHONE ENCOUNTER
Spoke to patient.. She will phone back when she is able to arrange a ride to schedule a colonoscopy.

## 2024-09-05 DIAGNOSIS — Z00.6 ENCOUNTER FOR EXAMINATION FOR NORMAL COMPARISON OR CONTROL IN CLINICAL RESEARCH PROGRAM: ICD-10-CM

## 2024-10-18 ENCOUNTER — TELEPHONE (OUTPATIENT)
Age: 44
End: 2024-10-18

## 2024-10-18 ENCOUNTER — TELEPHONE (OUTPATIENT)
Dept: FAMILY MEDICINE CLINIC | Facility: CLINIC | Age: 44
End: 2024-10-18

## 2024-10-18 NOTE — TELEPHONE ENCOUNTER
----- Message from Asa Kennedy MD sent at 10/17/2024  9:45 PM EDT -----  Please advise patient that labs are normal.       Patient called back for her lab results.   Relayed results to patient as per provider message.   Patient expressed understanding and advised patient to call if they had any further questions.     Thank you.

## 2024-10-18 NOTE — TELEPHONE ENCOUNTER
Patient called, and she had a question about weight loss management.     She had explained she had gone to weight management in the past and they just talked to her about dieting, which she was already doing.     She is asking if there was anything that her PCP could help her with for weight management.       Patient would like a call back with what her provider recommends.       Please advise, thank you

## 2024-10-18 NOTE — TELEPHONE ENCOUNTER
----- Message from Asa Kennedy MD sent at 10/17/2024  9:45 PM EDT -----  Please advise patient that labs are normal.

## 2024-10-19 NOTE — TELEPHONE ENCOUNTER
Recommend an appt to discuss medications. If I do not have any appts available recommend to see Kevin if he has openings.

## 2024-10-21 NOTE — TELEPHONE ENCOUNTER
Detailed msg left to call and schedule appt/use [employer] to schedule an appt to discuss further.

## 2024-10-25 ENCOUNTER — OFFICE VISIT (OUTPATIENT)
Dept: FAMILY MEDICINE CLINIC | Facility: CLINIC | Age: 44
End: 2024-10-25
Payer: COMMERCIAL

## 2024-10-25 VITALS
BODY MASS INDEX: 30.83 KG/M2 | DIASTOLIC BLOOD PRESSURE: 78 MMHG | SYSTOLIC BLOOD PRESSURE: 108 MMHG | OXYGEN SATURATION: 100 % | HEIGHT: 64 IN | WEIGHT: 180.6 LBS | HEART RATE: 98 BPM | TEMPERATURE: 97.5 F

## 2024-10-25 DIAGNOSIS — E66.811 OBESITY, CLASS I, BMI 30-34.9: ICD-10-CM

## 2024-10-25 DIAGNOSIS — Z12.11 COLON CANCER SCREENING: Primary | ICD-10-CM

## 2024-10-25 PROCEDURE — 99213 OFFICE O/P EST LOW 20 MIN: CPT

## 2024-10-25 RX ORDER — PHENTERMINE HYDROCHLORIDE 30 MG/1
30 CAPSULE ORAL EVERY MORNING
Qty: 30 CAPSULE | Refills: 0 | Status: SHIPPED | OUTPATIENT
Start: 2024-10-25 | End: 2024-11-24

## 2024-10-25 NOTE — PROGRESS NOTES
Ambulatory Visit  Name: Radha Lan      : 1980      MRN: [id number]  Encounter Provider: Kevin Augustin PA-C  Encounter Date: 10/25/2024   Encounter department: [facility]    Assessment & Plan  Obesity, Class I, BMI 30-34.9    Pt presents for wt loss  Was taking phentermine 15mg without AE or issue but states not losing wt anymore. Has not taken for 2 months now   Has seen Wt management already   Insurance does not cover wt loss medication  Hx of better response and wt loss to phentermine 37mg per pt   After discussing risks, benefits, and side effects of phentermine including insomnia, heart complications, and using shared decision making, will start the following: phentermine 30mg x 1 month with a maximum of 3 months   Continued to recommend diet and exercise modifications  Orders:    phentermine 30 MG capsule; Take 1 capsule (30 mg total) by mouth every morning    Colon cancer screening    Orders:    Cologuard       History of Present Illness     Radha Lan is a 44 y.o. female  presenting for wt loss discussion. She was taking phentermine 15mg but feels it is not working for her anymore.            History obtained from : patient  Review of Systems   Constitutional:  Negative for chills and fever.   HENT:  Negative for ear pain and sore throat.    Eyes:  Negative for pain and visual disturbance.   Respiratory:  Negative for cough and shortness of breath.    Cardiovascular:  Negative for chest pain and palpitations.   Gastrointestinal:  Negative for abdominal pain and vomiting.   Genitourinary:  Negative for dysuria and hematuria.   Musculoskeletal:  Negative for arthralgias and back pain.   Skin:  Negative for color change and rash.   Neurological:  Negative for seizures and syncope.   Psychiatric/Behavioral:  Positive for sleep disturbance (chronic insomnia).    All other systems reviewed and are negative.    Pertinent Medical History   Medical History Reviewed by provider  "this encounter:  Tobacco  Allergies  Meds  Problems  Med Hx  Surg Hx  Fam Hx       Past Medical History   History reviewed. No pertinent past medical history.  Past Surgical History:   Procedure Laterality Date     SECTION       Family History   Problem Relation Age of Onset    Arthritis Mother     Esophageal cancer Father     Ovarian cancer Sister     No Known Problems Maternal Grandmother     No Known Problems Maternal Grandfather     No Known Problems Paternal Grandmother     No Known Problems Paternal Grandfather     Breast cancer Paternal Aunt     Cancer Paternal Aunt     Cancer Paternal Uncle     No Known Problems Son     No Known Problems Son     No Known Problems Maternal Aunt     No Known Problems Maternal Aunt      Current Outpatient Medications on File Prior to Visit   Medication Sig Dispense Refill    [DISCONTINUED] phentermine 15 MG capsule Take 1 capsule (15 mg total) by mouth every morning (Patient not taking: Reported on 10/25/2024) 30 capsule 1     No current facility-administered medications on file prior to visit.   No Known Allergies   Current Outpatient Medications on File Prior to Visit   Medication Sig Dispense Refill    [DISCONTINUED] phentermine 15 MG capsule Take 1 capsule (15 mg total) by mouth every morning (Patient not taking: Reported on 10/25/2024) 30 capsule 1     No current facility-administered medications on file prior to visit.      Social History     Tobacco Use    Smoking status: Never     Passive exposure: Never    Smokeless tobacco: Never   Vaping Use    Vaping status: Never Used   Substance and Sexual Activity    Alcohol use: Not Currently    Drug use: Never    Sexual activity: Not on file         Objective     /78   Pulse 98   Temp 97.5 °F (36.4 °C)   Ht 5' 4\" (1.626 m)   Wt 81.9 kg (180 lb 9.6 oz)   SpO2 100%   BMI 31.00 kg/m²     Physical Exam  Vitals and nursing note reviewed.   Constitutional:       General: She is not in acute distress.     " Appearance: She is well-developed.   HENT:      Head: Normocephalic and atraumatic.   Eyes:      Conjunctiva/sclera: Conjunctivae normal.   Cardiovascular:      Rate and Rhythm: Normal rate and regular rhythm.      Heart sounds: No murmur heard.  Pulmonary:      Effort: Pulmonary effort is normal. No respiratory distress.      Breath sounds: Normal breath sounds.   Abdominal:      Palpations: Abdomen is soft.      Tenderness: There is no abdominal tenderness.   Musculoskeletal:         General: No swelling.      Cervical back: Neck supple.   Skin:     General: Skin is warm and dry.      Capillary Refill: Capillary refill takes less than 2 seconds.   Neurological:      Mental Status: She is alert.   Psychiatric:         Mood and Affect: Mood normal.

## 2024-11-01 ENCOUNTER — TELEPHONE (OUTPATIENT)
Age: 44
End: 2024-11-01

## 2024-11-01 NOTE — TELEPHONE ENCOUNTER
Upon review , visit was billed acute  [id number] [place] OFFICE/OUTPATIENT ESTABLISHED LOW MDM 20 MIN   Pt was not billed for a physical preventative visit, please advise if additional action is needed

## 2024-11-01 NOTE — TELEPHONE ENCOUNTER
Received a call from Steff from Sharon Arredondo from insurance as well as patient to let office know that the diagnosis code that was used for colon cancer screening is being denied by insurance.  Patient states the appointment on 10/25 had nothing to do with colon cancer screening.    Steff stated the provider just needs to change the diagnosis code, it is a Z code and must be changed by the provider.  Please advise.

## 2024-11-01 NOTE — TELEPHONE ENCOUNTER
Sharon and patient Radha on line together  [employer] [place]  [phone]    Disputing charges for date of service 10/25/24  Billing error, Billed for Preventative, patient was seen for weight loss. Please update billing charges and send to [facility] dept to resubmit charges to Insurance.    Warm transfer to [facility] billing dept    Please advise thank you

## 2024-11-01 NOTE — TELEPHONE ENCOUNTER
I spoke with the insurance company, they said that the visit diagnosis of Z12.01 (screening for colon cancer) is #1 in the visit diagnosis' and this is what is denying due to plan limitations. Spoke with  and she will work on this correction

## 2024-11-26 DIAGNOSIS — E66.811 OBESITY, CLASS I, BMI 30-34.9: ICD-10-CM

## 2024-11-26 RX ORDER — PHENTERMINE HYDROCHLORIDE 30 MG/1
30 CAPSULE ORAL EVERY MORNING
Qty: 30 CAPSULE | Refills: 0 | Status: SHIPPED | OUTPATIENT
Start: 2024-11-26 | End: 2024-12-26